# Patient Record
Sex: FEMALE | Race: WHITE | Employment: FULL TIME | URBAN - METROPOLITAN AREA
[De-identification: names, ages, dates, MRNs, and addresses within clinical notes are randomized per-mention and may not be internally consistent; named-entity substitution may affect disease eponyms.]

---
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
## 2023-01-31 ENCOUNTER — TELEPHONE (OUTPATIENT)
Dept: FAMILY MEDICINE CLINIC | Facility: CLINIC | Age: 18
End: 2023-01-31

## 2023-01-31 DIAGNOSIS — F41.1 GENERALIZED ANXIETY DISORDER: Primary | ICD-10-CM

## 2023-01-31 RX ORDER — CETIRIZINE HYDROCHLORIDE 10 MG/1
TABLET ORAL
Qty: 90 TABLET | Refills: 1 | Status: SHIPPED | OUTPATIENT
Start: 2023-01-31

## 2023-01-31 RX ORDER — SERTRALINE HYDROCHLORIDE 25 MG/1
TABLET, FILM COATED ORAL
Qty: 90 TABLET | Refills: 3 | Status: SHIPPED | OUTPATIENT
Start: 2023-01-31

## 2024-03-02 DIAGNOSIS — F41.1 GENERALIZED ANXIETY DISORDER: ICD-10-CM

## 2024-03-06 RX ORDER — SERTRALINE HYDROCHLORIDE 25 MG/1
TABLET, FILM COATED ORAL
Qty: 90 TABLET | Refills: 3 | OUTPATIENT
Start: 2024-03-06

## 2024-09-10 SDOH — ECONOMIC STABILITY: FOOD INSECURITY: WITHIN THE PAST 12 MONTHS, YOU WORRIED THAT YOUR FOOD WOULD RUN OUT BEFORE YOU GOT MONEY TO BUY MORE.: NEVER TRUE

## 2024-09-10 SDOH — ECONOMIC STABILITY: FOOD INSECURITY: WITHIN THE PAST 12 MONTHS, THE FOOD YOU BOUGHT JUST DIDN'T LAST AND YOU DIDN'T HAVE MONEY TO GET MORE.: NEVER TRUE

## 2024-09-10 SDOH — ECONOMIC STABILITY: INCOME INSECURITY: HOW HARD IS IT FOR YOU TO PAY FOR THE VERY BASICS LIKE FOOD, HOUSING, MEDICAL CARE, AND HEATING?: NOT VERY HARD

## 2024-09-10 SDOH — ECONOMIC STABILITY: TRANSPORTATION INSECURITY
IN THE PAST 12 MONTHS, HAS LACK OF TRANSPORTATION KEPT YOU FROM MEETINGS, WORK, OR FROM GETTING THINGS NEEDED FOR DAILY LIVING?: NO

## 2024-09-10 ASSESSMENT — PATIENT HEALTH QUESTIONNAIRE - PHQ9
SUM OF ALL RESPONSES TO PHQ9 QUESTIONS 1 & 2: 2
6. FEELING BAD ABOUT YOURSELF - OR THAT YOU ARE A FAILURE OR HAVE LET YOURSELF OR YOUR FAMILY DOWN: SEVERAL DAYS
8. MOVING OR SPEAKING SO SLOWLY THAT OTHER PEOPLE COULD HAVE NOTICED. OR THE OPPOSITE, BEING SO FIGETY OR RESTLESS THAT YOU HAVE BEEN MOVING AROUND A LOT MORE THAN USUAL: NEARLY EVERY DAY
8. MOVING OR SPEAKING SO SLOWLY THAT OTHER PEOPLE COULD HAVE NOTICED. OR THE OPPOSITE - BEING SO FIDGETY OR RESTLESS THAT YOU HAVE BEEN MOVING AROUND A LOT MORE THAN USUAL: NEARLY EVERY DAY
1. LITTLE INTEREST OR PLEASURE IN DOING THINGS: SEVERAL DAYS
1. LITTLE INTEREST OR PLEASURE IN DOING THINGS: SEVERAL DAYS
3. TROUBLE FALLING OR STAYING ASLEEP: NEARLY EVERY DAY
9. THOUGHTS THAT YOU WOULD BE BETTER OFF DEAD, OR OF HURTING YOURSELF: NOT AT ALL
7. TROUBLE CONCENTRATING ON THINGS, SUCH AS READING THE NEWSPAPER OR WATCHING TELEVISION: SEVERAL DAYS
2. FEELING DOWN, DEPRESSED OR HOPELESS: SEVERAL DAYS
5. POOR APPETITE OR OVEREATING: NEARLY EVERY DAY
4. FEELING TIRED OR HAVING LITTLE ENERGY: NEARLY EVERY DAY
SUM OF ALL RESPONSES TO PHQ QUESTIONS 1-9: 16
5. POOR APPETITE OR OVEREATING: NEARLY EVERY DAY
10. IF YOU CHECKED OFF ANY PROBLEMS, HOW DIFFICULT HAVE THESE PROBLEMS MADE IT FOR YOU TO DO YOUR WORK, TAKE CARE OF THINGS AT HOME, OR GET ALONG WITH OTHER PEOPLE: VERY DIFFICULT
SUM OF ALL RESPONSES TO PHQ QUESTIONS 1-9: 16
7. TROUBLE CONCENTRATING ON THINGS, SUCH AS READING THE NEWSPAPER OR WATCHING TELEVISION: SEVERAL DAYS
4. FEELING TIRED OR HAVING LITTLE ENERGY: NEARLY EVERY DAY
3. TROUBLE FALLING OR STAYING ASLEEP: NEARLY EVERY DAY
9. THOUGHTS THAT YOU WOULD BE BETTER OFF DEAD, OR OF HURTING YOURSELF: NOT AT ALL
SUM OF ALL RESPONSES TO PHQ QUESTIONS 1-9: 16
6. FEELING BAD ABOUT YOURSELF - OR THAT YOU ARE A FAILURE OR HAVE LET YOURSELF OR YOUR FAMILY DOWN: SEVERAL DAYS
SUM OF ALL RESPONSES TO PHQ QUESTIONS 1-9: 16
10. IF YOU CHECKED OFF ANY PROBLEMS, HOW DIFFICULT HAVE THESE PROBLEMS MADE IT FOR YOU TO DO YOUR WORK, TAKE CARE OF THINGS AT HOME, OR GET ALONG WITH OTHER PEOPLE: VERY DIFFICULT
2. FEELING DOWN, DEPRESSED OR HOPELESS: SEVERAL DAYS
SUM OF ALL RESPONSES TO PHQ QUESTIONS 1-9: 16

## 2024-09-13 ENCOUNTER — OFFICE VISIT (OUTPATIENT)
Dept: FAMILY MEDICINE CLINIC | Facility: CLINIC | Age: 19
End: 2024-09-13

## 2024-09-13 VITALS
DIASTOLIC BLOOD PRESSURE: 62 MMHG | SYSTOLIC BLOOD PRESSURE: 119 MMHG | BODY MASS INDEX: 25.63 KG/M2 | OXYGEN SATURATION: 99 % | HEIGHT: 66 IN | RESPIRATION RATE: 16 BRPM | WEIGHT: 159.5 LBS | HEART RATE: 88 BPM | TEMPERATURE: 98.1 F

## 2024-09-13 DIAGNOSIS — Z13.220 LIPID SCREENING: ICD-10-CM

## 2024-09-13 DIAGNOSIS — Z76.89 ESTABLISHING CARE WITH NEW DOCTOR, ENCOUNTER FOR: ICD-10-CM

## 2024-09-13 DIAGNOSIS — F41.1 GENERALIZED ANXIETY DISORDER: Primary | ICD-10-CM

## 2024-09-13 LAB
ALBUMIN SERPL-MCNC: 3.8 G/DL (ref 3.5–5)
ALBUMIN/GLOB SERPL: 1.3 (ref 1–1.9)
ALP SERPL-CCNC: 47 U/L (ref 35–104)
ALT SERPL-CCNC: 47 U/L (ref 12–65)
ANION GAP SERPL CALC-SCNC: 12 MMOL/L (ref 9–18)
AST SERPL-CCNC: 32 U/L (ref 15–37)
BASOPHILS # BLD: 0.1 K/UL (ref 0–0.2)
BASOPHILS NFR BLD: 1 % (ref 0–2)
BILIRUB SERPL-MCNC: <0.2 MG/DL (ref 0–1.2)
BUN SERPL-MCNC: 11 MG/DL (ref 6–23)
CALCIUM SERPL-MCNC: 9.7 MG/DL (ref 8.8–10.2)
CHLORIDE SERPL-SCNC: 104 MMOL/L (ref 98–107)
CHOLEST SERPL-MCNC: 203 MG/DL (ref 0–200)
CO2 SERPL-SCNC: 25 MMOL/L (ref 20–28)
CREAT SERPL-MCNC: 0.8 MG/DL (ref 0.6–1.1)
DIFFERENTIAL METHOD BLD: NORMAL
EOSINOPHIL # BLD: 0.4 K/UL (ref 0–0.8)
EOSINOPHIL NFR BLD: 5 % (ref 0.5–7.8)
ERYTHROCYTE [DISTWIDTH] IN BLOOD BY AUTOMATED COUNT: 13.1 % (ref 11.9–14.6)
GLOBULIN SER CALC-MCNC: 2.9 G/DL (ref 2.3–3.5)
GLUCOSE SERPL-MCNC: 85 MG/DL (ref 70–99)
HCT VFR BLD AUTO: 40.7 % (ref 35.8–46.3)
HDLC SERPL-MCNC: 58 MG/DL (ref 40–60)
HDLC SERPL: 3.5 (ref 0–5)
HGB BLD-MCNC: 13 G/DL (ref 11.7–15.4)
IMM GRANULOCYTES # BLD AUTO: 0 K/UL (ref 0–0.5)
IMM GRANULOCYTES NFR BLD AUTO: 0 % (ref 0–5)
LDLC SERPL CALC-MCNC: 121 MG/DL (ref 0–100)
LYMPHOCYTES # BLD: 2 K/UL (ref 0.5–4.6)
LYMPHOCYTES NFR BLD: 21 % (ref 13–44)
MCH RBC QN AUTO: 28.6 PG (ref 26.1–32.9)
MCHC RBC AUTO-ENTMCNC: 31.9 G/DL (ref 31.4–35)
MCV RBC AUTO: 89.5 FL (ref 82–102)
MONOCYTES # BLD: 0.7 K/UL (ref 0.1–1.3)
MONOCYTES NFR BLD: 8 % (ref 4–12)
NEUTS SEG # BLD: 6.4 K/UL (ref 1.7–8.2)
NEUTS SEG NFR BLD: 65 % (ref 43–78)
NRBC # BLD: 0 K/UL (ref 0–0.2)
PLATELET # BLD AUTO: 315 K/UL (ref 150–450)
PMV BLD AUTO: 10.2 FL (ref 9.4–12.3)
POTASSIUM SERPL-SCNC: 4.6 MMOL/L (ref 3.5–5.1)
PROT SERPL-MCNC: 6.7 G/DL (ref 6.3–8.2)
RBC # BLD AUTO: 4.55 M/UL (ref 4.05–5.2)
SODIUM SERPL-SCNC: 141 MMOL/L (ref 136–145)
TRIGL SERPL-MCNC: 125 MG/DL (ref 0–150)
TSH, 3RD GENERATION: 1.55 UIU/ML (ref 0.27–4.2)
VLDLC SERPL CALC-MCNC: 25 MG/DL (ref 6–23)
WBC # BLD AUTO: 9.6 K/UL (ref 4.3–11.1)

## 2024-09-13 RX ORDER — CETIRIZINE HYDROCHLORIDE 10 MG/1
10 TABLET ORAL DAILY
Qty: 90 TABLET | Refills: 1 | Status: SHIPPED | OUTPATIENT
Start: 2024-09-13

## 2024-09-13 RX ORDER — SERTRALINE HYDROCHLORIDE 25 MG/1
50 TABLET, FILM COATED ORAL DAILY
Qty: 60 TABLET | Refills: 1 | Status: SHIPPED | OUTPATIENT
Start: 2024-09-13

## 2024-09-13 RX ORDER — SERTRALINE HYDROCHLORIDE 25 MG/1
TABLET, FILM COATED ORAL
Qty: 90 TABLET | Refills: 3 | Status: CANCELLED | OUTPATIENT
Start: 2024-09-13

## 2024-09-13 ASSESSMENT — ANXIETY QUESTIONNAIRES
GAD7 TOTAL SCORE: 16
5. BEING SO RESTLESS THAT IT IS HARD TO SIT STILL: NEARLY EVERY DAY
1. FEELING NERVOUS, ANXIOUS, OR ON EDGE: NEARLY EVERY DAY
6. BECOMING EASILY ANNOYED OR IRRITABLE: MORE THAN HALF THE DAYS
7. FEELING AFRAID AS IF SOMETHING AWFUL MIGHT HAPPEN: NOT AT ALL
3. WORRYING TOO MUCH ABOUT DIFFERENT THINGS: NEARLY EVERY DAY
4. TROUBLE RELAXING: NEARLY EVERY DAY
IF YOU CHECKED OFF ANY PROBLEMS ON THIS QUESTIONNAIRE, HOW DIFFICULT HAVE THESE PROBLEMS MADE IT FOR YOU TO DO YOUR WORK, TAKE CARE OF THINGS AT HOME, OR GET ALONG WITH OTHER PEOPLE: SOMEWHAT DIFFICULT
2. NOT BEING ABLE TO STOP OR CONTROL WORRYING: MORE THAN HALF THE DAYS

## 2024-10-25 ENCOUNTER — TELEPHONE (OUTPATIENT)
Dept: ORTHOPEDIC SURGERY | Age: 19
End: 2024-10-25

## 2024-10-25 ENCOUNTER — OFFICE VISIT (OUTPATIENT)
Age: 19
End: 2024-10-25
Payer: COMMERCIAL

## 2024-10-25 DIAGNOSIS — M54.50 LOW BACK PAIN, UNSPECIFIED BACK PAIN LATERALITY, UNSPECIFIED CHRONICITY, UNSPECIFIED WHETHER SCIATICA PRESENT: Primary | ICD-10-CM

## 2024-10-25 DIAGNOSIS — M51.360 DEGENERATION OF INTERVERTEBRAL DISC OF LUMBAR REGION WITH DISCOGENIC BACK PAIN: ICD-10-CM

## 2024-10-25 DIAGNOSIS — R29.2 BRISK DEEP TENDON REFLEXES: ICD-10-CM

## 2024-10-25 PROCEDURE — 99204 OFFICE O/P NEW MOD 45 MIN: CPT | Performed by: PHYSICIAN ASSISTANT

## 2024-10-25 RX ORDER — NAPROXEN 500 MG/1
500 TABLET ORAL 2 TIMES DAILY WITH MEALS
Qty: 30 TABLET | Refills: 0 | Status: SHIPPED | OUTPATIENT
Start: 2024-10-25 | End: 2024-11-09

## 2024-10-25 NOTE — TELEPHONE ENCOUNTER
MRI LUMBAR SPINE APPROVAL     Status   Current Status: Approved   Validity Period: 10/25/2024 - 11/24/2024   Authorization: [id number] (Lumbar Spine MRI)   Patient   Name: SHAWN BARNES   Subscriber ID: [id number]   YOB: 2005   Gender: Female   Physician   Name: ROZ KRUGER   Provider ID: [id number]

## 2024-10-25 NOTE — PROGRESS NOTES
An order for PT on the Lumbar Spine has been entered.  An order for MRI of the Lumbar spine has been ordered.   
facets.  X-ray impression: No acute abnormality noted lumbar spine.      Assessment/Plan:       Diagnosis Orders   1. Low back pain, unspecified back pain laterality, unspecified chronicity, unspecified whether sciatica present  XR LUMBAR SPINE (2-3 VIEWS)    MRI LUMBAR SPINE WO CONTRAST    Amb External Referral To Physical Therapy      2. Degeneration of intervertebral disc of lumbar region with discogenic back pain  MRI LUMBAR SPINE WO CONTRAST    Amb External Referral To Physical Therapy      3. Brisk deep tendon reflexes          Due to the chronicity of symptoms and this 19-year-old and evidence of brisk lower extremity reflexes, I would recommend MRI of the lumbar spine to evaluate for disc herniation of the lumbar spine.    - Physical Therapy was prescribed and will include stretching, strengthening and modalities to promote blood flow, flexibility and core strengthening.  - NSAID: The patient is agreeable to a trial of nonsteroidal anti-inflammatory drugs (NSAIDs). We discussed risks associated with their use, including GI tract or other bleeding, and also some cardiac risk. Instructions were given to discontinue the NSAID if there is any sign of GI bleed, chest pain, or shortness of breath.  Continued use of NSAIDS is recommended to be managed by PCP to monitor kidney and liver function.  - A MRI was ordered to delineate anatomy, confirm the diagnosis and assess the severity. Further deliniation of anatomy will allow provider to recommend further appropriate treatment plan including lumbar steroid injections vs. surgery.       4 This is a chronic illness/condition with exacerbation and progression    Orders Placed This Encounter   Medications    naproxen (NAPROSYN) 500 MG tablet     Sig: Take 1 tablet by mouth 2 times daily (with meals) for 15 days     Dispense:  30 tablet     Refill:  0        Orders Placed This Encounter   Procedures    XR LUMBAR SPINE (2-3 VIEWS)    MRI LUMBAR SPINE WO CONTRAST    Amb

## 2024-11-11 ENCOUNTER — OFFICE VISIT (OUTPATIENT)
Dept: FAMILY MEDICINE CLINIC | Facility: CLINIC | Age: 19
End: 2024-11-11
Payer: COMMERCIAL

## 2024-11-11 VITALS
OXYGEN SATURATION: 98 % | TEMPERATURE: 99.9 F | DIASTOLIC BLOOD PRESSURE: 58 MMHG | SYSTOLIC BLOOD PRESSURE: 121 MMHG | HEIGHT: 66 IN | BODY MASS INDEX: 26.34 KG/M2 | WEIGHT: 163.9 LBS | HEART RATE: 78 BPM

## 2024-11-11 DIAGNOSIS — F41.1 GENERALIZED ANXIETY DISORDER: Primary | ICD-10-CM

## 2024-11-11 DIAGNOSIS — G47.9 SLEEP DISTURBANCE: ICD-10-CM

## 2024-11-11 PROCEDURE — 99213 OFFICE O/P EST LOW 20 MIN: CPT

## 2024-11-11 RX ORDER — SERTRALINE HYDROCHLORIDE 25 MG/1
25 TABLET, FILM COATED ORAL DAILY
Qty: 60 TABLET | Refills: 3 | Status: SHIPPED | OUTPATIENT
Start: 2024-11-11

## 2024-11-11 ASSESSMENT — PATIENT HEALTH QUESTIONNAIRE - PHQ9
SUM OF ALL RESPONSES TO PHQ QUESTIONS 1-9: 7
4. FEELING TIRED OR HAVING LITTLE ENERGY: SEVERAL DAYS
8. MOVING OR SPEAKING SO SLOWLY THAT OTHER PEOPLE COULD HAVE NOTICED. OR THE OPPOSITE, BEING SO FIGETY OR RESTLESS THAT YOU HAVE BEEN MOVING AROUND A LOT MORE THAN USUAL: NOT AT ALL
SUM OF ALL RESPONSES TO PHQ QUESTIONS 1-9: 7
1. LITTLE INTEREST OR PLEASURE IN DOING THINGS: NOT AT ALL
10. IF YOU CHECKED OFF ANY PROBLEMS, HOW DIFFICULT HAVE THESE PROBLEMS MADE IT FOR YOU TO DO YOUR WORK, TAKE CARE OF THINGS AT HOME, OR GET ALONG WITH OTHER PEOPLE: SOMEWHAT DIFFICULT
2. FEELING DOWN, DEPRESSED OR HOPELESS: SEVERAL DAYS
SUM OF ALL RESPONSES TO PHQ QUESTIONS 1-9: 7
5. POOR APPETITE OR OVEREATING: MORE THAN HALF THE DAYS
3. TROUBLE FALLING OR STAYING ASLEEP: SEVERAL DAYS
6. FEELING BAD ABOUT YOURSELF - OR THAT YOU ARE A FAILURE OR HAVE LET YOURSELF OR YOUR FAMILY DOWN: SEVERAL DAYS
9. THOUGHTS THAT YOU WOULD BE BETTER OFF DEAD, OR OF HURTING YOURSELF: NOT AT ALL
SUM OF ALL RESPONSES TO PHQ QUESTIONS 1-9: 7
7. TROUBLE CONCENTRATING ON THINGS, SUCH AS READING THE NEWSPAPER OR WATCHING TELEVISION: SEVERAL DAYS
SUM OF ALL RESPONSES TO PHQ9 QUESTIONS 1 & 2: 1

## 2024-11-11 ASSESSMENT — ANXIETY QUESTIONNAIRES
4. TROUBLE RELAXING: SEVERAL DAYS
7. FEELING AFRAID AS IF SOMETHING AWFUL MIGHT HAPPEN: NOT AT ALL
IF YOU CHECKED OFF ANY PROBLEMS ON THIS QUESTIONNAIRE, HOW DIFFICULT HAVE THESE PROBLEMS MADE IT FOR YOU TO DO YOUR WORK, TAKE CARE OF THINGS AT HOME, OR GET ALONG WITH OTHER PEOPLE: NOT DIFFICULT AT ALL
1. FEELING NERVOUS, ANXIOUS, OR ON EDGE: MORE THAN HALF THE DAYS
6. BECOMING EASILY ANNOYED OR IRRITABLE: SEVERAL DAYS
GAD7 TOTAL SCORE: 6
5. BEING SO RESTLESS THAT IT IS HARD TO SIT STILL: NOT AT ALL
3. WORRYING TOO MUCH ABOUT DIFFERENT THINGS: SEVERAL DAYS
2. NOT BEING ABLE TO STOP OR CONTROL WORRYING: SEVERAL DAYS

## 2024-11-11 NOTE — PROGRESS NOTES
[facility] [place] [address]  Phone: [phone] Fax [phone]  Niecy Mullen MD      Date of Service: 9/12/2024    Patient: Emi Molina  2005 19 y.o. female, established patient here for evaluation of the following chief complaint(s):      Chief complaint:   Chief Complaint   Patient presents with    Follow-up Chronic Condition     Pt here for a follow up for her anxiety       HISTORY OF PRESENTING ILLNESS     Emi Molina is a 19 y.o. female presented to the clinic for follow-up on her anxiety    Mood disorder:  Patient is here with complaints of mood disorder.    This is a/an chronic problem.    This has been going on for years  Status on current treatment plan - has been busy with work and keeping her mind busy   Overall clinical course is improving from onset.   Describes problems with mood as - both anxiety and depression  Exacerbating factors include - crowded places, work with  .    Alleviating factors include -still going to the gym  gym, listening to music .    Treatment in the past includes - prozac - didn't like it .    Patient does not have suicidal or homicidal ideation.    Patient is  having issues falling or staying asleep.    Patient is not having associated panic attacks.    Patient is not having visual and/or auditory hallucinations   No manic episodes   The above is  interfering with quality of life.    Patient has not seen a Counselor or psychologist before- but she had a bad experience    Patient is not interested in seeing a Counselor or psychologist   Patient does not use alcohol and/or drugs.     zoloft 50mg was making her feel more depressed and off, more flat      At home its hard to keep up with things makes list   Brother, mum both have ADHD - when she looks at them it is the same ,A lot of family on the adderall   Getting distracted easily, making a list to stay organize patient is more

## 2024-11-13 ENCOUNTER — OFFICE VISIT (OUTPATIENT)
Age: 19
End: 2024-11-13
Payer: COMMERCIAL

## 2024-11-13 DIAGNOSIS — M47.816 FACET SYNDROME, LUMBAR: Primary | ICD-10-CM

## 2024-11-13 PROBLEM — F41.1 GENERALIZED ANXIETY DISORDER: Status: ACTIVE | Noted: 2024-11-13

## 2024-11-13 PROCEDURE — 99214 OFFICE O/P EST MOD 30 MIN: CPT | Performed by: PHYSICIAN ASSISTANT

## 2024-11-13 RX ORDER — METHYLPREDNISOLONE 4 MG/1
TABLET ORAL
Qty: 1 KIT | Refills: 0 | Status: SHIPPED | OUTPATIENT
Start: 2024-11-13
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
## 2024-11-13 NOTE — ASSESSMENT & PLAN NOTE
-Well-controlled continue with Zoloft 25 mg  -Encouraged patient to talk to counselor -kindly declined  -Encouraged to do one pleasurable activity a day   -Discussed relaxation techniques with patient including - box breathing, increasing physical activity as able/walking, music, and meditation.   -Continue to monitor for specific triggers   -Continue to take medication as prescribed, medication refilled  -Patient understood and agrees with plan.         Orders:    sertraline (ZOLOFT) 25 MG tablet; Take 1 tablet by mouth daily

## 2024-12-29 ENCOUNTER — HOSPITAL ENCOUNTER (OUTPATIENT)
Dept: SLEEP CENTER | Age: 19
Discharge: HOME | End: 2025-01-01

## 2025-01-06 ASSESSMENT — PATIENT HEALTH QUESTIONNAIRE - PHQ9
10. IF YOU CHECKED OFF ANY PROBLEMS, HOW DIFFICULT HAVE THESE PROBLEMS MADE IT FOR YOU TO DO YOUR WORK, TAKE CARE OF THINGS AT HOME, OR GET ALONG WITH OTHER PEOPLE: SOMEWHAT DIFFICULT
1. LITTLE INTEREST OR PLEASURE IN DOING THINGS: NOT AT ALL
SUM OF ALL RESPONSES TO PHQ9 QUESTIONS 1 & 2: 0
4. FEELING TIRED OR HAVING LITTLE ENERGY: SEVERAL DAYS
SUM OF ALL RESPONSES TO PHQ QUESTIONS 1-9: 6
8. MOVING OR SPEAKING SO SLOWLY THAT OTHER PEOPLE COULD HAVE NOTICED. OR THE OPPOSITE - BEING SO FIDGETY OR RESTLESS THAT YOU HAVE BEEN MOVING AROUND A LOT MORE THAN USUAL: MORE THAN HALF THE DAYS
3. TROUBLE FALLING OR STAYING ASLEEP: SEVERAL DAYS
2. FEELING DOWN, DEPRESSED OR HOPELESS: NOT AT ALL
5. POOR APPETITE OR OVEREATING: SEVERAL DAYS
8. MOVING OR SPEAKING SO SLOWLY THAT OTHER PEOPLE COULD HAVE NOTICED. OR THE OPPOSITE, BEING SO FIGETY OR RESTLESS THAT YOU HAVE BEEN MOVING AROUND A LOT MORE THAN USUAL: MORE THAN HALF THE DAYS
6. FEELING BAD ABOUT YOURSELF - OR THAT YOU ARE A FAILURE OR HAVE LET YOURSELF OR YOUR FAMILY DOWN: NOT AT ALL
2. FEELING DOWN, DEPRESSED OR HOPELESS: NOT AT ALL
5. POOR APPETITE OR OVEREATING: SEVERAL DAYS
7. TROUBLE CONCENTRATING ON THINGS, SUCH AS READING THE NEWSPAPER OR WATCHING TELEVISION: SEVERAL DAYS
1. LITTLE INTEREST OR PLEASURE IN DOING THINGS: NOT AT ALL
4. FEELING TIRED OR HAVING LITTLE ENERGY: SEVERAL DAYS
10. IF YOU CHECKED OFF ANY PROBLEMS, HOW DIFFICULT HAVE THESE PROBLEMS MADE IT FOR YOU TO DO YOUR WORK, TAKE CARE OF THINGS AT HOME, OR GET ALONG WITH OTHER PEOPLE: SOMEWHAT DIFFICULT
SUM OF ALL RESPONSES TO PHQ QUESTIONS 1-9: 6
6. FEELING BAD ABOUT YOURSELF - OR THAT YOU ARE A FAILURE OR HAVE LET YOURSELF OR YOUR FAMILY DOWN: NOT AT ALL
SUM OF ALL RESPONSES TO PHQ QUESTIONS 1-9: 6
SUM OF ALL RESPONSES TO PHQ QUESTIONS 1-9: 6
3. TROUBLE FALLING OR STAYING ASLEEP: SEVERAL DAYS
SUM OF ALL RESPONSES TO PHQ QUESTIONS 1-9: 6
7. TROUBLE CONCENTRATING ON THINGS, SUCH AS READING THE NEWSPAPER OR WATCHING TELEVISION: SEVERAL DAYS
9. THOUGHTS THAT YOU WOULD BE BETTER OFF DEAD, OR OF HURTING YOURSELF: NOT AT ALL
9. THOUGHTS THAT YOU WOULD BE BETTER OFF DEAD, OR OF HURTING YOURSELF: NOT AT ALL

## 2025-01-12 SDOH — ECONOMIC STABILITY: FOOD INSECURITY: WITHIN THE PAST 12 MONTHS, YOU WORRIED THAT YOUR FOOD WOULD RUN OUT BEFORE YOU GOT MONEY TO BUY MORE.: NEVER TRUE

## 2025-01-12 SDOH — ECONOMIC STABILITY: FOOD INSECURITY: WITHIN THE PAST 12 MONTHS, THE FOOD YOU BOUGHT JUST DIDN'T LAST AND YOU DIDN'T HAVE MONEY TO GET MORE.: NEVER TRUE

## 2025-01-12 SDOH — ECONOMIC STABILITY: INCOME INSECURITY: IN THE LAST 12 MONTHS, WAS THERE A TIME WHEN YOU WERE NOT ABLE TO PAY THE MORTGAGE OR RENT ON TIME?: NO

## 2025-01-12 SDOH — ECONOMIC STABILITY: TRANSPORTATION INSECURITY
IN THE PAST 12 MONTHS, HAS THE LACK OF TRANSPORTATION KEPT YOU FROM MEDICAL APPOINTMENTS OR FROM GETTING MEDICATIONS?: NO

## 2025-01-13 ENCOUNTER — OFFICE VISIT (OUTPATIENT)
Dept: FAMILY MEDICINE CLINIC | Facility: CLINIC | Age: 20
End: 2025-01-13
Payer: COMMERCIAL

## 2025-01-13 VITALS
OXYGEN SATURATION: 99 % | HEART RATE: 91 BPM | WEIGHT: 164.4 LBS | TEMPERATURE: 100.2 F | SYSTOLIC BLOOD PRESSURE: 114 MMHG | HEIGHT: 66 IN | DIASTOLIC BLOOD PRESSURE: 70 MMHG | BODY MASS INDEX: 26.42 KG/M2

## 2025-01-13 DIAGNOSIS — L30.4 INTERTRIGO: ICD-10-CM

## 2025-01-13 DIAGNOSIS — F41.1 GENERALIZED ANXIETY DISORDER: ICD-10-CM

## 2025-01-13 DIAGNOSIS — G47.9 SLEEP DISTURBANCE: ICD-10-CM

## 2025-01-13 DIAGNOSIS — F90.2 ADHD (ATTENTION DEFICIT HYPERACTIVITY DISORDER), COMBINED TYPE: Primary | ICD-10-CM

## 2025-01-13 PROCEDURE — 99214 OFFICE O/P EST MOD 30 MIN: CPT

## 2025-01-13 RX ORDER — SERTRALINE HYDROCHLORIDE 25 MG/1
25 TABLET, FILM COATED ORAL DAILY
Qty: 60 TABLET | Refills: 3 | Status: SHIPPED | OUTPATIENT
Start: 2025-01-13

## 2025-01-13 RX ORDER — TRIAMCINOLONE ACETONIDE 1 MG/G
OINTMENT TOPICAL 2 TIMES DAILY
Qty: 30 G | Refills: 0 | Status: SHIPPED | OUTPATIENT
Start: 2025-01-13 | End: 2025-01-20

## 2025-01-13 RX ORDER — NYSTATIN 100000 U/G
CREAM TOPICAL
Qty: 30 G | Refills: 0 | Status: SHIPPED | OUTPATIENT
Start: 2025-01-13

## 2025-01-13 RX ORDER — DEXTROAMPHETAMINE SACCHARATE, AMPHETAMINE ASPARTATE MONOHYDRATE, DEXTROAMPHETAMINE SULFATE AND AMPHETAMINE SULFATE 1.25; 1.25; 1.25; 1.25 MG/1; MG/1; MG/1; MG/1
5 CAPSULE, EXTENDED RELEASE ORAL DAILY
Qty: 30 CAPSULE | Refills: 0 | Status: SHIPPED | OUTPATIENT
Start: 2025-01-13 | End: 2025-02-12

## 2025-01-13 ASSESSMENT — ANXIETY QUESTIONNAIRES
1. FEELING NERVOUS, ANXIOUS, OR ON EDGE: MORE THAN HALF THE DAYS
6. BECOMING EASILY ANNOYED OR IRRITABLE: NOT AT ALL
3. WORRYING TOO MUCH ABOUT DIFFERENT THINGS: SEVERAL DAYS
2. NOT BEING ABLE TO STOP OR CONTROL WORRYING: NOT AT ALL
7. FEELING AFRAID AS IF SOMETHING AWFUL MIGHT HAPPEN: NOT AT ALL
IF YOU CHECKED OFF ANY PROBLEMS ON THIS QUESTIONNAIRE, HOW DIFFICULT HAVE THESE PROBLEMS MADE IT FOR YOU TO DO YOUR WORK, TAKE CARE OF THINGS AT HOME, OR GET ALONG WITH OTHER PEOPLE: NOT DIFFICULT AT ALL
4. TROUBLE RELAXING: SEVERAL DAYS
5. BEING SO RESTLESS THAT IT IS HARD TO SIT STILL: MORE THAN HALF THE DAYS
GAD7 TOTAL SCORE: 6

## 2025-01-13 NOTE — PROGRESS NOTES
[facility] [place] [address]  Phone: [phone] Fax [phone]  Niecy Mullen MD      Date of Service: 9/12/2024    Patient: Emi Molina  2005 19 y.o. female, established patient here for evaluation of the following chief complaint(s):      Chief complaint:   Chief Complaint   Patient presents with    ADHD     Follow up for ADHD - Pt states that she did the sleep study on 12/29/24     Rash     Pt noticed a rash in between and under her breasts       HISTORY OF PRESENTING ILLNESS     Emi Molina is a 19 y.o. female presented to the clinic for follow-up on her anxiety, rash and anxiety.  Depression    Rash   Around the breast   Patient said that has been there for months she finds herself always sweating in the nighttime and has not been putting much except for cream on it  She has been showering in the mornings      Mood disorder:  Patient is here with complaints of mood disorder.    This is a/an chronic problem.    This has been going on for years  Status on current treatment plan - has been busy with work and keeping her mind busy   Overall clinical course is improving from onset.   Describes problems with mood as - both anxiety and depression  Exacerbating factors include - crowded places, work with  .    Alleviating factors include -still going to the gym  gym, listening to music .    Treatment in the past includes - prozac - didn't like it .  Doing well on the zoloft 25  Patient does not have suicidal or homicidal ideation.    Patient is  having issues falling or staying asleep.    Patient is not having associated panic attacks.    Patient is not having visual and/or auditory hallucinations   No manic episodes   The above is  interfering with quality of life.    Patient has not seen a Counselor or psychologist before- but she had a bad experience    Patient is  interested in seeing a Counselor or psychologist

## 2025-01-31 ENCOUNTER — TELEPHONE (OUTPATIENT)
Dept: SLEEP MEDICINE | Age: 20
End: 2025-01-31

## 2025-01-31 NOTE — TELEPHONE ENCOUNTER
Hypersomnia is out of proportion to study findings. Evaluate if patient has risk factors for narcolepsy and needs an MSLT. Clinical correlation suggested- please schedule patient for sleep study results. Last seen in 2022.

## 2025-02-11 NOTE — PROGRESS NOTES
Connections: Unknown (3/20/2021)    Received from [facility]    Social Connections     Frequency of Communication with Friends and Family: Not asked     Frequency of Social Gatherings with Friends and Family: Not asked   Intimate Partner Violence: Unknown (3/20/2021)    Received from [facility]    Intimate Partner Violence     Fear of Current or Ex-Partner: Not asked     Emotionally Abused: Not asked     Physically Abused: Not asked     Sexually Abused: Not asked   Housing Stability: Low Risk  (1/12/2025)    Housing Stability Vital Sign     Unable to Pay for Housing in the Last Year: No     Number of Times Moved in the Last Year: 0     Homeless in the Last Year: No         Family History   Problem Relation Age of Onset    Depression Mother     Diabetes Mother     No Known Problems Father          No Known Allergies      Current Outpatient Medications   Medication Sig    sertraline (ZOLOFT) 25 MG tablet Take 1 tablet by mouth daily    amphetamine-dextroamphetamine (ADDERALL XR) 5 MG extended release capsule Take 1 capsule by mouth daily for 30 days. Max Daily Amount: 5 mg    nystatin (MYCOSTATIN) 255652 UNIT/GM cream Apply topically 2 times daily.    cetirizine (ZYRTEC) 10 MG tablet Take 1 tablet by mouth daily     No current facility-administered medications for this visit.           REVIEW OF SYSTEMS:   CONSTITUTIONAL: Hypersomnia   There is no history of fever, chills, night sweats, weight loss, weight gain, persistent fatigue.   CARDIAC:   No chest pain, pressure, discomfort, palpitations, orthopnea, murmurs, or edema.   GI:   No dysphagia, heartburn reflux, nausea/vomiting, diarrhea, abdominal pain, or bleeding.   NEURO:   There is no history of AMS, persistent headache, decreased level of consciousness, seizures, or motor or sensory deficits.      PHYSICAL EXAM:    Vitals:    02/14/25 1439   BP: 136/81   Site: Left Upper Arm   Position: Sitting   Pulse: 75   Resp: 19   Temp:

## 2025-02-14 ENCOUNTER — OFFICE VISIT (OUTPATIENT)
Dept: SLEEP MEDICINE | Age: 20
End: 2025-02-14
Payer: COMMERCIAL

## 2025-02-14 VITALS
HEART RATE: 75 BPM | RESPIRATION RATE: 19 BRPM | BODY MASS INDEX: 26.52 KG/M2 | OXYGEN SATURATION: 99 % | HEIGHT: 66 IN | WEIGHT: 165 LBS | DIASTOLIC BLOOD PRESSURE: 81 MMHG | TEMPERATURE: 97.2 F | SYSTOLIC BLOOD PRESSURE: 136 MMHG

## 2025-02-14 DIAGNOSIS — G47.10 HYPERSOMNIA: Primary | ICD-10-CM

## 2025-02-14 DIAGNOSIS — G47.00 PERSISTENT DISORDER OF INITIATING OR MAINTAINING SLEEP: ICD-10-CM

## 2025-02-14 PROCEDURE — 99214 OFFICE O/P EST MOD 30 MIN: CPT | Performed by: STUDENT IN AN ORGANIZED HEALTH CARE EDUCATION/TRAINING PROGRAM

## 2025-02-14 ASSESSMENT — SLEEP AND FATIGUE QUESTIONNAIRES
HOW LIKELY ARE YOU TO NOD OFF OR FALL ASLEEP WHILE SITTING INACTIVE IN A PUBLIC PLACE: SLIGHT CHANCE OF DOZING
HOW LIKELY ARE YOU TO NOD OFF OR FALL ASLEEP WHILE SITTING AND TALKING TO SOMEONE: SLIGHT CHANCE OF DOZING
HOW LIKELY ARE YOU TO NOD OFF OR FALL ASLEEP WHILE LYING DOWN TO REST IN THE AFTERNOON WHEN CIRCUMSTANCES PERMIT: HIGH CHANCE OF DOZING
HOW LIKELY ARE YOU TO NOD OFF OR FALL ASLEEP WHILE SITTING QUIETLY AFTER LUNCH WITHOUT ALCOHOL: MODERATE CHANCE OF DOZING
HOW LIKELY ARE YOU TO NOD OFF OR FALL ASLEEP WHEN YOU ARE A PASSENGER IN A CAR FOR AN HOUR WITHOUT A BREAK: HIGH CHANCE OF DOZING
HOW LIKELY ARE YOU TO NOD OFF OR FALL ASLEEP IN A CAR, WHILE STOPPED FOR A FEW MINUTES IN TRAFFIC: WOULD NEVER DOZE
HOW LIKELY ARE YOU TO NOD OFF OR FALL ASLEEP WHILE SITTING AND READING: SLIGHT CHANCE OF DOZING
HOW LIKELY ARE YOU TO NOD OFF OR FALL ASLEEP WHILE WATCHING TV: HIGH CHANCE OF DOZING
ESS TOTAL SCORE: 14

## 2025-02-20 ENCOUNTER — OFFICE VISIT (OUTPATIENT)
Dept: FAMILY MEDICINE CLINIC | Facility: CLINIC | Age: 20
End: 2025-02-20
Payer: COMMERCIAL

## 2025-02-20 VITALS
HEART RATE: 90 BPM | TEMPERATURE: 99 F | SYSTOLIC BLOOD PRESSURE: 121 MMHG | HEIGHT: 66 IN | OXYGEN SATURATION: 99 % | DIASTOLIC BLOOD PRESSURE: 55 MMHG | BODY MASS INDEX: 26.05 KG/M2 | WEIGHT: 162.1 LBS

## 2025-02-20 DIAGNOSIS — F90.2 ADHD (ATTENTION DEFICIT HYPERACTIVITY DISORDER), COMBINED TYPE: Primary | ICD-10-CM

## 2025-02-20 DIAGNOSIS — F32.A ANXIETY AND DEPRESSION: ICD-10-CM

## 2025-02-20 DIAGNOSIS — T88.7XXA MEDICATION SIDE EFFECT: ICD-10-CM

## 2025-02-20 DIAGNOSIS — R68.82 DECREASED LIBIDO: ICD-10-CM

## 2025-02-20 DIAGNOSIS — F41.9 ANXIETY AND DEPRESSION: ICD-10-CM

## 2025-02-20 PROCEDURE — 99214 OFFICE O/P EST MOD 30 MIN: CPT

## 2025-02-20 RX ORDER — DEXTROAMPHETAMINE SACCHARATE, AMPHETAMINE ASPARTATE MONOHYDRATE, DEXTROAMPHETAMINE SULFATE AND AMPHETAMINE SULFATE 1.25; 1.25; 1.25; 1.25 MG/1; MG/1; MG/1; MG/1
5 CAPSULE, EXTENDED RELEASE ORAL DAILY
Qty: 30 CAPSULE | Refills: 0 | Status: CANCELLED | OUTPATIENT
Start: 2025-03-22 | End: 2025-04-21

## 2025-02-20 RX ORDER — DEXTROAMPHETAMINE SACCHARATE, AMPHETAMINE ASPARTATE MONOHYDRATE, DEXTROAMPHETAMINE SULFATE AND AMPHETAMINE SULFATE 2.5; 2.5; 2.5; 2.5 MG/1; MG/1; MG/1; MG/1
10 CAPSULE, EXTENDED RELEASE ORAL DAILY
Qty: 25 CAPSULE | Refills: 0 | Status: SHIPPED | OUTPATIENT
Start: 2025-02-20 | End: 2025-03-17

## 2025-02-20 RX ORDER — DEXTROAMPHETAMINE SACCHARATE, AMPHETAMINE ASPARTATE MONOHYDRATE, DEXTROAMPHETAMINE SULFATE AND AMPHETAMINE SULFATE 2.5; 2.5; 2.5; 2.5 MG/1; MG/1; MG/1; MG/1
10 CAPSULE, EXTENDED RELEASE ORAL DAILY
Qty: 30 CAPSULE | Refills: 0 | Status: SHIPPED | OUTPATIENT
Start: 2025-03-22 | End: 2025-04-21

## 2025-02-20 RX ORDER — BUPROPION HYDROCHLORIDE 150 MG/1
150 TABLET ORAL EVERY MORNING
Qty: 30 TABLET | Refills: 3 | Status: SHIPPED | OUTPATIENT
Start: 2025-02-20

## 2025-02-20 RX ORDER — DEXTROAMPHETAMINE SACCHARATE, AMPHETAMINE ASPARTATE MONOHYDRATE, DEXTROAMPHETAMINE SULFATE AND AMPHETAMINE SULFATE 2.5; 2.5; 2.5; 2.5 MG/1; MG/1; MG/1; MG/1
10 CAPSULE, EXTENDED RELEASE ORAL DAILY
Qty: 30 CAPSULE | Refills: 0 | Status: SHIPPED | OUTPATIENT
Start: 2025-04-21 | End: 2025-05-21

## 2025-02-20 RX ORDER — DEXTROAMPHETAMINE SACCHARATE, AMPHETAMINE ASPARTATE MONOHYDRATE, DEXTROAMPHETAMINE SULFATE AND AMPHETAMINE SULFATE 1.25; 1.25; 1.25; 1.25 MG/1; MG/1; MG/1; MG/1
5 CAPSULE, EXTENDED RELEASE ORAL DAILY
Qty: 30 CAPSULE | Refills: 0 | Status: CANCELLED | OUTPATIENT
Start: 2025-04-21 | End: 2025-05-21

## 2025-02-20 RX ORDER — DEXTROAMPHETAMINE SACCHARATE, AMPHETAMINE ASPARTATE MONOHYDRATE, DEXTROAMPHETAMINE SULFATE AND AMPHETAMINE SULFATE 1.25; 1.25; 1.25; 1.25 MG/1; MG/1; MG/1; MG/1
5 CAPSULE, EXTENDED RELEASE ORAL DAILY
Qty: 30 CAPSULE | Refills: 0 | Status: CANCELLED | OUTPATIENT
Start: 2025-02-20 | End: 2025-03-22

## 2025-02-20 ASSESSMENT — ANXIETY QUESTIONNAIRES
4. TROUBLE RELAXING: MORE THAN HALF THE DAYS
7. FEELING AFRAID AS IF SOMETHING AWFUL MIGHT HAPPEN: NOT AT ALL
GAD7 TOTAL SCORE: 8
5. BEING SO RESTLESS THAT IT IS HARD TO SIT STILL: MORE THAN HALF THE DAYS
IF YOU CHECKED OFF ANY PROBLEMS ON THIS QUESTIONNAIRE, HOW DIFFICULT HAVE THESE PROBLEMS MADE IT FOR YOU TO DO YOUR WORK, TAKE CARE OF THINGS AT HOME, OR GET ALONG WITH OTHER PEOPLE: NOT DIFFICULT AT ALL
3. WORRYING TOO MUCH ABOUT DIFFERENT THINGS: NOT AT ALL
2. NOT BEING ABLE TO STOP OR CONTROL WORRYING: SEVERAL DAYS
6. BECOMING EASILY ANNOYED OR IRRITABLE: MORE THAN HALF THE DAYS
1. FEELING NERVOUS, ANXIOUS, OR ON EDGE: SEVERAL DAYS

## 2025-02-20 ASSESSMENT — PATIENT HEALTH QUESTIONNAIRE - PHQ9
2. FEELING DOWN, DEPRESSED OR HOPELESS: NOT AT ALL
10. IF YOU CHECKED OFF ANY PROBLEMS, HOW DIFFICULT HAVE THESE PROBLEMS MADE IT FOR YOU TO DO YOUR WORK, TAKE CARE OF THINGS AT HOME, OR GET ALONG WITH OTHER PEOPLE: SOMEWHAT DIFFICULT
SUM OF ALL RESPONSES TO PHQ QUESTIONS 1-9: 10
9. THOUGHTS THAT YOU WOULD BE BETTER OFF DEAD, OR OF HURTING YOURSELF: NOT AT ALL
4. FEELING TIRED OR HAVING LITTLE ENERGY: MORE THAN HALF THE DAYS
SUM OF ALL RESPONSES TO PHQ9 QUESTIONS 1 & 2: 0
5. POOR APPETITE OR OVEREATING: MORE THAN HALF THE DAYS
7. TROUBLE CONCENTRATING ON THINGS, SUCH AS READING THE NEWSPAPER OR WATCHING TELEVISION: MORE THAN HALF THE DAYS
SUM OF ALL RESPONSES TO PHQ QUESTIONS 1-9: 10
6. FEELING BAD ABOUT YOURSELF - OR THAT YOU ARE A FAILURE OR HAVE LET YOURSELF OR YOUR FAMILY DOWN: NOT AT ALL
1. LITTLE INTEREST OR PLEASURE IN DOING THINGS: NOT AT ALL
SUM OF ALL RESPONSES TO PHQ QUESTIONS 1-9: 10
3. TROUBLE FALLING OR STAYING ASLEEP: MORE THAN HALF THE DAYS
SUM OF ALL RESPONSES TO PHQ QUESTIONS 1-9: 10
8. MOVING OR SPEAKING SO SLOWLY THAT OTHER PEOPLE COULD HAVE NOTICED. OR THE OPPOSITE, BEING SO FIGETY OR RESTLESS THAT YOU HAVE BEEN MOVING AROUND A LOT MORE THAN USUAL: MORE THAN HALF THE DAYS

## 2025-02-20 NOTE — PROGRESS NOTES
Emi Molina (: 2005) is a 20 y.o. female is here for evaluation of the following chief complaint(s): ADHD (Here for a 1 month follow up for ADHD)    Assessment/Plan:     Assessment & Plan  ADHD (attention deficit hyperactivity disorder), combined type   Chronic, not at goal (unstable), changes made today: increase adderall from 5 to 10mg , medication adherence emphasized, and lifestyle modifications recommended  Patient has a signed controlled substance agreement, agreeable to random UDS, pill counts.   Patient also educated about : Behavior training  Behavior training can help adults with ADHD learn how to:   -Get organized. A daily organizer or planner can help these adults organize their daily tasks. They can write down appointments and other things they need to remember.   -Decrease distractions. They can set up their work or home environment so that there are fewer things that will distract them. They may find using headphones or a \"white noise\" machine helpful. College students can arrange a quiet living situation. They may need a single dorm room.   - Work on relationships. Social skills training can help adults with ADHD relate to family, friends, and coworkers. Couples counseling or family therapy can also help improve relationships.  -I have reviewed Emi Molina controlled substance prescription history, as maintained in the [place] Prescription Drug Monitoring Program, so that the prescription for a controlled substance can be given.   Orders:    amphetamine-dextroamphetamine (ADDERALL XR) 10 MG extended release capsule; Take 1 capsule by mouth daily for 25 days. Max Daily Amount: 10 mg    amphetamine-dextroamphetamine (ADDERALL XR) 10 MG extended release capsule; Take 1 capsule by mouth daily for 30 days. Max Daily Amount: 10 mg    amphetamine-dextroamphetamine (ADDERALL XR) 10 MG extended release capsule; Take 1 capsule by mouth daily for 30 days. Max Daily Amount: 10

## 2025-02-21 NOTE — ASSESSMENT & PLAN NOTE
Chronic, not at goal (stable), continue current treatment plan, medication adherence emphasized, and lifestyle modifications recommended  -Encouraged patient to talk to counselor- declined at this time   -Encouraged to do one pleasurable activity a day   -Discussed relaxation techniques with patient including - box breathing, increasing physical activity as able/walking, music, and meditation.   -Continue to monitor for specific triggers   -Continue to take medication as prescribed  -Patient understood and agrees with plan.     Orders:    buPROPion (WELLBUTRIN XL) 150 MG extended release tablet; Take 1 tablet by mouth every morning

## 2025-02-21 NOTE — ASSESSMENT & PLAN NOTE
Chronic, not at goal (unstable), changes made today: increase adderall from 5 to 10mg , medication adherence emphasized, and lifestyle modifications recommended  Patient has a signed controlled substance agreement, agreeable to random UDS, pill counts.   Patient also educated about : Behavior training  Behavior training can help adults with ADHD learn how to:   -Get organized. A daily organizer or planner can help these adults organize their daily tasks. They can write down appointments and other things they need to remember.   -Decrease distractions. They can set up their work or home environment so that there are fewer things that will distract them. They may find using headphones or a \"white noise\" machine helpful. College students can arrange a quiet living situation. They may need a single dorm room.   - Work on relationships. Social skills training can help adults with ADHD relate to family, friends, and coworkers. Couples counseling or family therapy can also help improve relationships.  -I have reviewed Emi Molina controlled substance prescription history, as maintained in the [place] Prescription Drug Monitoring Program, so that the prescription for a controlled substance can be given.   Orders:    amphetamine-dextroamphetamine (ADDERALL XR) 10 MG extended release capsule; Take 1 capsule by mouth daily for 25 days. Max Daily Amount: 10 mg    amphetamine-dextroamphetamine (ADDERALL XR) 10 MG extended release capsule; Take 1 capsule by mouth daily for 30 days. Max Daily Amount: 10 mg    amphetamine-dextroamphetamine (ADDERALL XR) 10 MG extended release capsule; Take 1 capsule by mouth daily for 30 days. Max Daily Amount: 10 mg    buPROPion (WELLBUTRIN XL) 150 MG extended release tablet; Take 1 tablet by mouth every morning        Of note patient currently is off the zoloft, will be getting additional studies done for narcolepsy. Patient advised to stay off the adderall, Wellbutrin and

## 2025-02-21 NOTE — ASSESSMENT & PLAN NOTE
Orders:    buPROPion (WELLBUTRIN XL) 150 MG extended release tablet; Take 1 tablet by mouth every morning

## 2025-02-21 NOTE — ASSESSMENT & PLAN NOTE
Patient educated about the risks, benefits, side effects of wellbutrin, it will help with the ssri side effects but can be used off lablel for ADHD as well.     Orders:    buPROPion (WELLBUTRIN XL) 150 MG extended release tablet; Take 1 tablet by mouth every morning

## 2025-03-12 ENCOUNTER — HOSPITAL ENCOUNTER (OUTPATIENT)
Dept: SLEEP CENTER | Age: 20
Discharge: HOME | End: 2025-03-14

## 2025-03-31 ENCOUNTER — RESULTS FOLLOW-UP (OUTPATIENT)
Dept: SLEEP MEDICINE | Age: 20
End: 2025-03-31

## 2025-05-01 ENCOUNTER — TELEPHONE (OUTPATIENT)
Dept: SLEEP MEDICINE | Age: 20
End: 2025-05-01

## 2025-05-19 ENCOUNTER — OFFICE VISIT (OUTPATIENT)
Dept: FAMILY MEDICINE CLINIC | Facility: CLINIC | Age: 20
End: 2025-05-19
Payer: COMMERCIAL

## 2025-05-19 ENCOUNTER — PATIENT MESSAGE (OUTPATIENT)
Dept: FAMILY MEDICINE CLINIC | Facility: CLINIC | Age: 20
End: 2025-05-19

## 2025-05-19 VITALS
DIASTOLIC BLOOD PRESSURE: 63 MMHG | BODY MASS INDEX: 25.38 KG/M2 | HEART RATE: 71 BPM | SYSTOLIC BLOOD PRESSURE: 108 MMHG | OXYGEN SATURATION: 100 % | TEMPERATURE: 97 F | HEIGHT: 66 IN | WEIGHT: 157.9 LBS

## 2025-05-19 DIAGNOSIS — F41.9 ANXIETY AND DEPRESSION: Primary | ICD-10-CM

## 2025-05-19 DIAGNOSIS — F90.2 ADHD (ATTENTION DEFICIT HYPERACTIVITY DISORDER), COMBINED TYPE: ICD-10-CM

## 2025-05-19 DIAGNOSIS — F32.A ANXIETY AND DEPRESSION: Primary | ICD-10-CM

## 2025-05-19 DIAGNOSIS — N80.9 ENDOMETRIOSIS: ICD-10-CM

## 2025-05-19 PROCEDURE — 99214 OFFICE O/P EST MOD 30 MIN: CPT

## 2025-05-19 RX ORDER — DEXTROAMPHETAMINE SACCHARATE, AMPHETAMINE ASPARTATE MONOHYDRATE, DEXTROAMPHETAMINE SULFATE AND AMPHETAMINE SULFATE 2.5; 2.5; 2.5; 2.5 MG/1; MG/1; MG/1; MG/1
10 CAPSULE, EXTENDED RELEASE ORAL DAILY
Qty: 30 CAPSULE | Refills: 0 | Status: SHIPPED | OUTPATIENT
Start: 2025-06-19 | End: 2025-07-19

## 2025-05-19 RX ORDER — HYDROXYZINE HYDROCHLORIDE 25 MG/1
25 TABLET, FILM COATED ORAL EVERY 8 HOURS PRN
Qty: 30 TABLET | Refills: 0 | Status: SHIPPED | OUTPATIENT
Start: 2025-05-19 | End: 2025-05-29

## 2025-05-19 RX ORDER — DEXTROAMPHETAMINE SACCHARATE, AMPHETAMINE ASPARTATE MONOHYDRATE, DEXTROAMPHETAMINE SULFATE AND AMPHETAMINE SULFATE 2.5; 2.5; 2.5; 2.5 MG/1; MG/1; MG/1; MG/1
10 CAPSULE, EXTENDED RELEASE ORAL DAILY
Qty: 25 CAPSULE | Refills: 0 | Status: SHIPPED | OUTPATIENT
Start: 2025-07-19 | End: 2025-08-13

## 2025-05-19 RX ORDER — DEXTROAMPHETAMINE SACCHARATE, AMPHETAMINE ASPARTATE MONOHYDRATE, DEXTROAMPHETAMINE SULFATE AND AMPHETAMINE SULFATE 2.5; 2.5; 2.5; 2.5 MG/1; MG/1; MG/1; MG/1
10 CAPSULE, EXTENDED RELEASE ORAL DAILY
Qty: 30 CAPSULE | Refills: 0 | Status: SHIPPED | OUTPATIENT
Start: 2025-05-19 | End: 2025-06-18

## 2025-05-19 ASSESSMENT — PATIENT HEALTH QUESTIONNAIRE - PHQ9
10. IF YOU CHECKED OFF ANY PROBLEMS, HOW DIFFICULT HAVE THESE PROBLEMS MADE IT FOR YOU TO DO YOUR WORK, TAKE CARE OF THINGS AT HOME, OR GET ALONG WITH OTHER PEOPLE: NOT DIFFICULT AT ALL
SUM OF ALL RESPONSES TO PHQ QUESTIONS 1-9: 3
3. TROUBLE FALLING OR STAYING ASLEEP: SEVERAL DAYS
SUM OF ALL RESPONSES TO PHQ QUESTIONS 1-9: 3
4. FEELING TIRED OR HAVING LITTLE ENERGY: SEVERAL DAYS
7. TROUBLE CONCENTRATING ON THINGS, SUCH AS READING THE NEWSPAPER OR WATCHING TELEVISION: NOT AT ALL
1. LITTLE INTEREST OR PLEASURE IN DOING THINGS: NOT AT ALL
5. POOR APPETITE OR OVEREATING: SEVERAL DAYS
SUM OF ALL RESPONSES TO PHQ QUESTIONS 1-9: 3
8. MOVING OR SPEAKING SO SLOWLY THAT OTHER PEOPLE COULD HAVE NOTICED. OR THE OPPOSITE, BEING SO FIGETY OR RESTLESS THAT YOU HAVE BEEN MOVING AROUND A LOT MORE THAN USUAL: NOT AT ALL
SUM OF ALL RESPONSES TO PHQ QUESTIONS 1-9: 3
9. THOUGHTS THAT YOU WOULD BE BETTER OFF DEAD, OR OF HURTING YOURSELF: NOT AT ALL
2. FEELING DOWN, DEPRESSED OR HOPELESS: NOT AT ALL
6. FEELING BAD ABOUT YOURSELF - OR THAT YOU ARE A FAILURE OR HAVE LET YOURSELF OR YOUR FAMILY DOWN: NOT AT ALL

## 2025-05-19 NOTE — ASSESSMENT & PLAN NOTE
Chronic, at goal (stable),   - Adderall is helping her complete tasks and manage her new job effectively.  - No side effects such as insomnia, appetite loss, dizziness, or lightheadedness reported.  - Continues behavioral health components like organization and relationship management.  - Refill for Adderall prescription provided.  Patient has a signed controlled substance agreement, agreeable to random UDS, pill counts.  Patient also educated about : Behavior training  Behavior training can help adults with ADHD learn how to:   -Get organized. A daily organizer or planner can help these adults organize their daily tasks. They can write down appointments and other things they need to remember.   -Decrease distractions. They can set up their work or home environment so that there are fewer things that will distract them. They may find using headphones or a \"white noise\" machine helpful. College students can arrange a quiet living situation. They may need a single dorm room.   - Work on relationships. Social skills training can help adults with ADHD relate to family, friends, and coworkers. Couples counseling or family therapy can also help improve relationships.  -I have reviewed Emi Molina controlled substance prescription history, as maintained in the [place] Prescription Drug Monitoring Program, so that the prescription for a controlled substance can be given.    Orders:    amphetamine-dextroamphetamine (ADDERALL XR) 10 MG extended release capsule; Take 1 capsule by mouth daily for 30 days. Max Daily Amount: 10 mg    amphetamine-dextroamphetamine (ADDERALL XR) 10 MG extended release capsule; Take 1 capsule by mouth daily for 30 days. Max Daily Amount: 10 mg    amphetamine-dextroamphetamine (ADDERALL XR) 10 MG extended release capsule; Take 1 capsule by mouth daily for 25 days. Max Daily Amount: 10 mg

## 2025-05-19 NOTE — PROGRESS NOTES
training can help adults with ADHD relate to family, friends, and coworkers. Couples counseling or family therapy can also help improve relationships.  -I have reviewed Emi Molina controlled substance prescription history, as maintained in the [place] Prescription Drug Monitoring Program, so that the prescription for a controlled substance can be given.    Orders:    amphetamine-dextroamphetamine (ADDERALL XR) 10 MG extended release capsule; Take 1 capsule by mouth daily for 30 days. Max Daily Amount: 10 mg    amphetamine-dextroamphetamine (ADDERALL XR) 10 MG extended release capsule; Take 1 capsule by mouth daily for 30 days. Max Daily Amount: 10 mg    amphetamine-dextroamphetamine (ADDERALL XR) 10 MG extended release capsule; Take 1 capsule by mouth daily for 25 days. Max Daily Amount: 10 mg    Anxiety and depression   Chronic, at goal (stable),   - Anxiety appears to be well-managed since discontinuing medication. Depression also shows signs of improvement.  - Conditions may be influenced by seasonal or situational factors, particularly her transition from a negative work environment to a more positive one.  - Advised to continue with lifestyle modifications if beneficial. Encouraged to seek immediate medical attention if symptoms resurge.  - Prescription for hydroxyzine provided, to be taken every 8 hours as needed. Potential side effects discussed. Zoloft and Wellbutrin prescriptions temporarily discontinued.       Endometriosis   Monitored by specialist- no acute findings meriting change in the plan  - Advised to consult with gynecologist regarding resuming or altering birth control medication.  - Pain management with ibuprofen and heating pad recommended.  - No extreme bleeding reported.  - Discussed the role of birth control in regulating growth and sizes, not just for contraception.            Counseled regarding above diagnosis, including possible risks and complications, especially if

## 2025-05-19 NOTE — ASSESSMENT & PLAN NOTE
Chronic, at goal (stable),   - Anxiety appears to be well-managed since discontinuing medication. Depression also shows signs of improvement.  - Conditions may be influenced by seasonal or situational factors, particularly her transition from a negative work environment to a more positive one.  - Advised to continue with lifestyle modifications if beneficial. Encouraged to seek immediate medical attention if symptoms resurge.  - Prescription for hydroxyzine provided, to be taken every 8 hours as needed. Potential side effects discussed. Zoloft and Wellbutrin prescriptions temporarily discontinued.